# Patient Record
Sex: FEMALE
[De-identification: names, ages, dates, MRNs, and addresses within clinical notes are randomized per-mention and may not be internally consistent; named-entity substitution may affect disease eponyms.]

---

## 2024-09-05 PROBLEM — Z00.00 ENCOUNTER FOR PREVENTIVE HEALTH EXAMINATION: Status: ACTIVE | Noted: 2024-09-05

## 2024-09-10 ENCOUNTER — APPOINTMENT (OUTPATIENT)
Dept: PEDIATRIC CARDIOLOGY | Facility: CLINIC | Age: 34
End: 2024-09-10
Payer: COMMERCIAL

## 2024-09-10 VITALS
BODY MASS INDEX: 41.1 KG/M2 | DIASTOLIC BLOOD PRESSURE: 84 MMHG | HEART RATE: 67 BPM | WEIGHT: 271.17 LBS | HEIGHT: 68.11 IN | SYSTOLIC BLOOD PRESSURE: 145 MMHG | OXYGEN SATURATION: 97 %

## 2024-09-10 VITALS — SYSTOLIC BLOOD PRESSURE: 123 MMHG | DIASTOLIC BLOOD PRESSURE: 76 MMHG

## 2024-09-10 DIAGNOSIS — Q21.0 VENTRICULAR SEPTAL DEFECT: ICD-10-CM

## 2024-09-10 DIAGNOSIS — I47.29 OTHER VENTRICULAR TACHYCARDIA: ICD-10-CM

## 2024-09-10 DIAGNOSIS — R00.2 PALPITATIONS: ICD-10-CM

## 2024-09-10 DIAGNOSIS — Z78.9 OTHER SPECIFIED HEALTH STATUS: ICD-10-CM

## 2024-09-10 DIAGNOSIS — Z95.810 PRESENCE OF AUTOMATIC (IMPLANTABLE) CARDIAC DEFIBRILLATOR: ICD-10-CM

## 2024-09-10 DIAGNOSIS — Q24.9 CONGENITAL MALFORMATION OF HEART, UNSPECIFIED: ICD-10-CM

## 2024-09-10 DIAGNOSIS — Z82.49 FAMILY HISTORY OF ISCHEMIC HEART DISEASE AND OTHER DISEASES OF THE CIRCULATORY SYSTEM: ICD-10-CM

## 2024-09-10 DIAGNOSIS — Q22.0 PULMONARY VALVE ATRESIA: ICD-10-CM

## 2024-09-10 PROCEDURE — 99204 OFFICE O/P NEW MOD 45 MIN: CPT

## 2024-09-10 PROCEDURE — G2211 COMPLEX E/M VISIT ADD ON: CPT

## 2024-09-10 PROCEDURE — 93000 ELECTROCARDIOGRAM COMPLETE: CPT

## 2024-09-10 RX ORDER — KRILL/OM-3/DHA/EPA/PHOSPHO/AST 1000-230MG
81 CAPSULE ORAL
Refills: 0 | Status: ACTIVE | COMMUNITY

## 2024-09-10 RX ORDER — METOPROLOL SUCCINATE 100 MG/1
100 TABLET, EXTENDED RELEASE ORAL DAILY
Refills: 0 | Status: ACTIVE | COMMUNITY
Start: 2024-09-10

## 2024-09-10 RX ORDER — SERTRALINE 25 MG/1
25 TABLET, FILM COATED ORAL DAILY
Refills: 0 | Status: ACTIVE | COMMUNITY
Start: 2024-09-10

## 2024-09-10 RX ORDER — TOPIRAMATE 50 MG/1
50 TABLET, COATED ORAL DAILY
Refills: 0 | Status: ACTIVE | COMMUNITY
Start: 2024-09-10

## 2024-09-10 RX ORDER — SERTRALINE HYDROCHLORIDE 50 MG/1
50 TABLET, FILM COATED ORAL DAILY
Refills: 0 | Status: ACTIVE | COMMUNITY
Start: 2024-09-10

## 2024-09-11 ENCOUNTER — OUTPATIENT (OUTPATIENT)
Dept: OUTPATIENT SERVICES | Facility: HOSPITAL | Age: 34
LOS: 1 days | End: 2024-09-11
Payer: COMMERCIAL

## 2024-09-11 ENCOUNTER — APPOINTMENT (OUTPATIENT)
Dept: RADIOLOGY | Facility: CLINIC | Age: 34
End: 2024-09-11
Payer: COMMERCIAL

## 2024-09-11 ENCOUNTER — RESULT REVIEW (OUTPATIENT)
Age: 34
End: 2024-09-11

## 2024-09-11 DIAGNOSIS — Q21.0 VENTRICULAR SEPTAL DEFECT: ICD-10-CM

## 2024-09-11 PROBLEM — Z78.9 CURRENT NON-SMOKER: Status: ACTIVE | Noted: 2024-09-10

## 2024-09-11 PROBLEM — Q24.9 CONGENITAL HEART DISEASE: Status: ACTIVE | Noted: 2024-09-11

## 2024-09-11 PROBLEM — R00.2 PALPITATION: Status: ACTIVE | Noted: 2024-09-11

## 2024-09-11 PROCEDURE — 71046 X-RAY EXAM CHEST 2 VIEWS: CPT | Mod: 26

## 2024-09-11 PROCEDURE — 71046 X-RAY EXAM CHEST 2 VIEWS: CPT

## 2024-09-11 NOTE — HISTORY OF PRESENT ILLNESS
[FreeTextEntry1] : Taylor is a 34 y/o with PMHx of pulmonary atresia and VSD s/p classic right BT shunt, Rastelli with 16 mm pulmonary homograft (1 years of age), and most recently a RV-to-PA conduit replacement with a 24 mm valved conduit (6/2002). She underwent a dual-chamber AICD on 5/13/22 for syncope/NSVT that required explantation and replacement due to malfunction/fracture of leads (9/7/22, [facility]). Her current system is a Biotronik and programmed VDD. She was previously following with Dr. Robbins at the [facility], and was most recently seen on 7/8/24.   Taylor presents to clinic today, 9/10/24, with her partner to establish care with the [facility] at [facility]. She moved from [place] about a month ago to be with her partner. She denies any chest pain, does report some dyspnea on exertion and fatigue since moving to [place]; she attributes this to walking more here vs in [place]. Denies light-headedness, dizziness, syncope. Does report palpitations, which have always been present, although are less frequent. Does not measure her BP regularly at home but states she has never been told that it was elevated. Her main complaint today is that the site of her ICD has been tender to the touch, and "bulging" out more than normal. Started experiencing this after moving to [place]. States she has been cautious and denies any heavy lifting or chest trauma. She states there was no acute change/episode, it just started hurting to the touch/bulging. No better or worse over the last 1-2 weeks. No erythema or warmth. No fever.  Currently works from home doing as a . Does not regularly exercise.   Current Medications include: Metoprolol 100mg daily, Sertraline 75mg, Topirimate 50mg, Aspirin 81mg,   Social History: Does not smoke/vape, drinks Alcohol occasionally, denies Marijuana or other illegal drug use  OB/GYN: Not on any birth control  Family History: Mother- Afib, PPM Father- CABG, PPM  Brother- ? AF

## 2024-09-11 NOTE — REVIEW OF SYSTEMS
[Dyspnea on exertion] : dyspnea during exertion [Palpitations] : palpitations [Fever] : no fever [Headache] : no headache [Chills] : no chills [Feeling Fatigued] : not feeling fatigued [Blurry Vision] : no blurred vision [Seeing Double (Diplopia)] : no diplopia [Earache] : no earache [Discharge From Ears] : no discharge from the ears [Sore Throat] : no sore throat [Chest Discomfort] : no chest discomfort [Syncope] : no syncope [Cough] : no cough [Wheezing] : no wheezing [Abdominal Pain] : no abdominal pain [Nausea] : no nausea [Vomiting] : no vomiting [Constipation] : no constipation [Dysuria] : no dysuria [Joint Pain] : no joint pain [Myalgia] : no myalgia [Rash] : no rash [Skin Lesions] : no skin lesions [Dizziness] : no dizziness [Convulsions] : no convulsions [Confusion] : no confusion was observed [Easy Bleeding] : no tendency for easy bleeding [Easy Bruising] : no tendency for easy bruising

## 2024-09-11 NOTE — CARDIOLOGY SUMMARY
[de-identified] : 9/10/24: sinus bradycardia with PACs, rightward axis, nonspecific intraventricular block [de-identified] : 10/17/23-11/3/23: no episodes of tachyarrhythmia, bradyarrhythmia, or any significant pauses recorded. Occasional PVCs including ventricular couplets.  [de-identified] : 2/5/18: she exercised for 7 minutes. Her baseline HR was 80 bbp and BP was 118/80. Peak HR was 164 bpm, 85% predicted. Her peak BP was not obtainable, but 102/71 at 1st minute into recovery (hypotensive response to exercise). Her saturations remained normal throughout the testing. She had symptoms of CP and SOB starting at 5 min. Resolved a few minutes into recovery. Sinus rhythm with BBB throughout. One single PAC with exercise and one single PVC with recovery. PFT showed moderate restrictive pattern at rest. Peak VO2 was 21 mL/kg/min which was 109% predicted.  [de-identified] : 7/8/24: doppler interrogation of the prosthetic pulmonary valve is suboptimal, however, there is probably severe insufficiency based on spectral doppler. There is mild AI. The BiV fxn appears to be normal. The estimated RVSP is unchanged at 50 mmHg + RA pressure.  [de-identified] : 4/26/16: MRI showed RV EDV of 82 mL/m2, RV EF of 38%, LV EF of 57%, PI < 5%, ascending aorta 3.7 cm, patent conduit and PAs  [de-identified] : 7/17/23: VDD, base rate 50, upper track rate 130, mode switching 160/VDIR, AV Delay 250 ms, AF rate > 200, VT monitor interval 150 bpm, 0% a pacing, 0% v pacing, 1 episode of slightly wobbling monomorphic asymptomatic NSVT (11 beats), A sensing 1.7 mV, V sensing 10.8 mV, pacing impedance 443 ohms, shock impedance 61 ohms, threshold 0.9 V @ 0.4 ms, output 1.9 V at 0.4 ms. Normal device function, no new changes made. [de-identified] : 5/13/22: Positive EP study with inducible sustained pulseless fast monomorphic VT s/p successful DC external cardioversion to sinus rhythm, s/p dual chamber transvenous AICD with placement with left pectoral generator. [de-identified] : 5/13/22: mildly elevated RVSP (2/3 systemic), gradient of 12-15 mmHg across the pulmonary homograft, RV EDP of 19 mmHg, no branch PA gradients, mean PA pressure of 28 mmHg, LV EDP of 15 mmHg, no left-sided obstruction, underappreciated PI based on low PA diastolic pressure and RV diastolic dysfunction

## 2024-09-11 NOTE — PHYSICAL EXAM
[Well Developed] : well developed [Well Nourished] : well nourished [Obese] : obese [Normal Conjunctiva] : normal conjunctiva [Normal S1, S2] : normal S1, S2 [No Rub] : no rub [No Gallop] : no gallop [Murmur] : murmur [Clear Lung Fields] : clear lung fields [Good Air Entry] : good air entry [No Respiratory Distress] : no respiratory distress  [Soft] : abdomen soft [Non Tender] : non-tender [Normal Gait] : normal gait [No Edema] : no edema [No Cyanosis] : no cyanosis [No Clubbing] : no clubbing [No Rash] : no rash [Moves all extremities] : moves all extremities [Normal Speech] : normal speech [Alert and Oriented] : alert and oriented [de-identified] : II/VI MIHAELA heard best in the LUSB, I/IV diastolic murmur [de-identified] : pacemaker pocket in left chest with no erythema or warmth, mild tenderness to palpation [de-identified] : 2+ in LUE and pedal pulses bilaterally, RUE with little to no pulse palpable

## 2024-09-11 NOTE — PHYSICAL EXAM
[Well Developed] : well developed [Well Nourished] : well nourished [Obese] : obese [Normal Conjunctiva] : normal conjunctiva [Normal S1, S2] : normal S1, S2 [No Rub] : no rub [No Gallop] : no gallop [Murmur] : murmur [Clear Lung Fields] : clear lung fields [Good Air Entry] : good air entry [No Respiratory Distress] : no respiratory distress  [Soft] : abdomen soft [Non Tender] : non-tender [Normal Gait] : normal gait [No Edema] : no edema [No Cyanosis] : no cyanosis [No Clubbing] : no clubbing [No Rash] : no rash [Moves all extremities] : moves all extremities [Normal Speech] : normal speech [Alert and Oriented] : alert and oriented [de-identified] : II/VI MIHAELA heard best in the LUSB, I/IV diastolic murmur [de-identified] : pacemaker pocket in left chest with no erythema or warmth, mild tenderness to palpation [de-identified] : 2+ in LUE and pedal pulses bilaterally, RUE with little to no pulse palpable

## 2024-09-11 NOTE — HISTORY OF PRESENT ILLNESS
[FreeTextEntry1] : Taylor is a 34 y/o with PMHx of pulmonary atresia and VSD s/p classic right BT shunt, Rastelli with 16 mm pulmonary homograft (1 years of age), and most recently a RV-to-PA conduit replacement with a 24 mm valved conduit (6/2002). She underwent a dual-chamber AICD on 5/13/22 for syncope/NSVT that required explantation and replacement due to malfunction/fracture of leads (9/7/22, [facility]). Her current system is a Biotronik and programmed VDD. She was previously following with Dr. Robbins at the [place] [facility], and was most recently seen on 7/8/24.   Taylor presents to clinic today, 9/10/24, with her partner to establish care with the [facility] at [facility]. She moved from [place] about a month ago to be with her partner. She denies any chest pain, does report some dyspnea on exertion and fatigue since moving to [place]; she attributes this to walking more here vs in [place]. Denies light-headedness, dizziness, syncope. Does report palpitations, which have always been present, although are less frequent. Does not measure her BP regularly at home but states she has never been told that it was elevated. Her main complaint today is that the site of her ICD has been tender to the touch, and "bulging" out more than normal. Started experiencing this after moving to [place]. States she has been cautious and denies any heavy lifting or chest trauma. She states there was no acute change/episode, it just started hurting to the touch/bulging. No better or worse over the last 1-2 weeks. No erythema or warmth. No fever.  Currently works from home doing as a . Does not regularly exercise.   Current Medications include: Metoprolol 100mg daily, Sertraline 75mg, Topirimate 50mg, Aspirin 81mg,   Social History: Does not smoke/vape, drinks Alcohol occasionally, denies Marijuana or other illegal drug use  OB/GYN: Not on any birth control  Family History: Mother- Afib, PPM Father- CABG, PPM  Brother- ? AF

## 2024-09-11 NOTE — DISCUSSION/SUMMARY
[FreeTextEntry1] : Taylor is a 34 y/o with PMHx of pulmonary atresia and VSD s/p classic right BT shunt, Rastelli with 16 mm pulmonary homograft (1 years of age), and most recently a RV-to-PA conduit replacement with a 24 mm valved conduit (6/2002). She underwent a dual-chamber AICD on 5/13/22 for syncope/NSVT that required explantation and replacement due to malfunction/fracture of leads (9/7/22, [facility]).   Pulmonary atresia, VSD s/p classic right BT shunt, Rastelli, and most recently a RV-to-PA conduit replacement (6/2002) -likely has free PI based on diastolic murmur on physical exam, as well as her most recently reported echo and cath from [place] -seems symptomatic with exertion, likely NYHA FC 2 -RV reportedly with normal function on most recent echo, but EF was 38% on MRI in 2016 PLAN ----congenital cardiac CT in 6 months to evaluate conduit, biventricular size and function ----will likely need a CPET in the future ----may need a TPVI in the near future, discussed with patient that we will get more information with the CT first ----stressed SBE ppx 30-60 prior to any dental work  ----red flag signs and sx were discussed with the patient, if she were to develop any significant dyspnea, limitations in activity, she should seek immediate care ----continue current cardiac medications: metoprolol 100 mg daily, aspirin 81 mg daily  NSVT s/p dual chamber AICD Generator pocket bulging/pain without warm, erythema, or fever -leads extracted and Biotronik device implanted in 2022 due to lead fracture/malfunction -reportedly last interrogation was in July of 2024 in [place] with no issues identified, last report we have is from 2023 with 1 episode of asymptomatic NSVT PLAN ----CXR to evaluate device generator and lead placement  ----Device clinic referral for repeat interrogation in October of 2024 ----Continue cardiac medications as above  Healthcare maintenance  PLAN ----stressed the importance of establishing care with a PCP ----stressed the importance of dental hygiene emphasized along with regular dental visits  ----stressed the importance of continued CPAP use throughout the night for VAZQUEZ  F/U PLAN ----CXR ordered today for lead/device eval ----Device clinic on 10/7/24 for interrogation ----follow up in 6 months with a clinic visit, EKG, and cardiac CT

## 2024-09-11 NOTE — REASON FOR VISIT
[Congenital Heart Disease] : congenital heart disease [Other: _____] : [unfilled] [FreeTextEntry1] : New Patient Appointment/Establish Care

## 2024-09-11 NOTE — CARDIOLOGY SUMMARY
[de-identified] : 9/10/24: sinus bradycardia with PACs, rightward axis, nonspecific intraventricular block [de-identified] : 10/17/23-11/3/23: no episodes of tachyarrhythmia, bradyarrhythmia, or any significant pauses recorded. Occasional PVCs including ventricular couplets.  [de-identified] : 2/5/18: she exercised for 7 minutes. Her baseline HR was 80 bbp and BP was 118/80. Peak HR was 164 bpm, 85% predicted. Her peak BP was not obtainable, but 102/71 at 1st minute into recovery (hypotensive response to exercise). Her saturations remained normal throughout the testing. She had symptoms of CP and SOB starting at 5 min. Resolved a few minutes into recovery. Sinus rhythm with BBB throughout. One single PAC with exercise and one single PVC with recovery. PFT showed moderate restrictive pattern at rest. Peak VO2 was 21 mL/kg/min which was 109% predicted.  [de-identified] : 7/8/24: doppler interrogation of the prosthetic pulmonary valve is suboptimal, however, there is probably severe insufficiency based on spectral doppler. There is mild AI. The BiV fxn appears to be normal. The estimated RVSP is unchanged at 50 mmHg + RA pressure.  [de-identified] : 4/26/16: MRI showed RV EDV of 82 mL/m2, RV EF of 38%, LV EF of 57%, PI < 5%, ascending aorta 3.7 cm, patent conduit and PAs  [de-identified] : 7/17/23: VDD, base rate 50, upper track rate 130, mode switching 160/VDIR, AV Delay 250 ms, AF rate > 200, VT monitor interval 150 bpm, 0% a pacing, 0% v pacing, 1 episode of slightly wobbling monomorphic asymptomatic NSVT (11 beats), A sensing 1.7 mV, V sensing 10.8 mV, pacing impedance 443 ohms, shock impedance 61 ohms, threshold 0.9 V @ 0.4 ms, output 1.9 V at 0.4 ms. Normal device function, no new changes made. [de-identified] : 5/13/22: Positive EP study with inducible sustained pulseless fast monomorphic VT s/p successful DC external cardioversion to sinus rhythm, s/p dual chamber transvenous AICD with placement with left pectoral generator. [de-identified] : 5/13/22: mildly elevated RVSP (2/3 systemic), gradient of 12-15 mmHg across the pulmonary homograft, RV EDP of 19 mmHg, no branch PA gradients, mean PA pressure of 28 mmHg, LV EDP of 15 mmHg, no left-sided obstruction, underappreciated PI based on low PA diastolic pressure and RV diastolic dysfunction

## 2024-10-07 ENCOUNTER — APPOINTMENT (OUTPATIENT)
Dept: PEDIATRIC CARDIOLOGY | Facility: CLINIC | Age: 34
End: 2024-10-07
Payer: COMMERCIAL

## 2024-10-07 VITALS
WEIGHT: 274.03 LBS | BODY MASS INDEX: 41.53 KG/M2 | OXYGEN SATURATION: 96 % | SYSTOLIC BLOOD PRESSURE: 124 MMHG | HEART RATE: 62 BPM | HEIGHT: 67.95 IN | DIASTOLIC BLOOD PRESSURE: 83 MMHG

## 2024-10-07 DIAGNOSIS — Q21.0 VENTRICULAR SEPTAL DEFECT: ICD-10-CM

## 2024-10-07 DIAGNOSIS — I47.29 OTHER VENTRICULAR TACHYCARDIA: ICD-10-CM

## 2024-10-07 DIAGNOSIS — Z95.810 PRESENCE OF AUTOMATIC (IMPLANTABLE) CARDIAC DEFIBRILLATOR: ICD-10-CM

## 2024-10-07 PROCEDURE — 93282 PRGRMG EVAL IMPLANTABLE DFB: CPT

## 2024-10-07 PROCEDURE — 99212 OFFICE O/P EST SF 10 MIN: CPT

## 2024-10-10 ENCOUNTER — NON-APPOINTMENT (OUTPATIENT)
Age: 34
End: 2024-10-10

## 2024-10-15 ENCOUNTER — NON-APPOINTMENT (OUTPATIENT)
Age: 34
End: 2024-10-15

## 2024-11-15 ENCOUNTER — NON-APPOINTMENT (OUTPATIENT)
Age: 34
End: 2024-11-15

## 2024-11-15 ENCOUNTER — APPOINTMENT (OUTPATIENT)
Dept: OBGYN | Facility: CLINIC | Age: 34
End: 2024-11-15
Payer: COMMERCIAL

## 2024-11-15 VITALS
SYSTOLIC BLOOD PRESSURE: 164 MMHG | BODY MASS INDEX: 40.81 KG/M2 | WEIGHT: 260 LBS | DIASTOLIC BLOOD PRESSURE: 84 MMHG | HEIGHT: 67 IN

## 2024-11-15 DIAGNOSIS — N92.0 EXCESSIVE AND FREQUENT MENSTRUATION WITH REGULAR CYCLE: ICD-10-CM

## 2024-11-15 DIAGNOSIS — N94.6 DYSMENORRHEA, UNSPECIFIED: ICD-10-CM

## 2024-11-15 DIAGNOSIS — Z01.419 ENCOUNTER FOR GYNECOLOGICAL EXAMINATION (GENERAL) (ROUTINE) W/OUT ABNORMAL FINDINGS: ICD-10-CM

## 2024-11-15 PROCEDURE — 99459 PELVIC EXAMINATION: CPT

## 2024-11-15 PROCEDURE — 99213 OFFICE O/P EST LOW 20 MIN: CPT | Mod: 25

## 2024-11-15 PROCEDURE — 99385 PREV VISIT NEW AGE 18-39: CPT

## 2024-11-21 LAB
CYTOLOGY CVX/VAG DOC THIN PREP: ABNORMAL
HPV HIGH+LOW RISK DNA PNL CVX: NOT DETECTED

## 2025-01-09 ENCOUNTER — APPOINTMENT (OUTPATIENT)
Dept: INTERNAL MEDICINE | Facility: CLINIC | Age: 35
End: 2025-01-09

## 2025-01-23 ENCOUNTER — APPOINTMENT (OUTPATIENT)
Dept: OBGYN | Facility: CLINIC | Age: 35
End: 2025-01-23

## 2025-02-07 ENCOUNTER — NON-APPOINTMENT (OUTPATIENT)
Age: 35
End: 2025-02-07

## 2025-02-11 ENCOUNTER — ASOB RESULT (OUTPATIENT)
Age: 35
End: 2025-02-11

## 2025-02-11 ENCOUNTER — APPOINTMENT (OUTPATIENT)
Dept: OBGYN | Facility: CLINIC | Age: 35
End: 2025-02-11
Payer: COMMERCIAL

## 2025-02-11 ENCOUNTER — TRANSCRIPTION ENCOUNTER (OUTPATIENT)
Age: 35
End: 2025-02-11

## 2025-02-11 PROCEDURE — 76830 TRANSVAGINAL US NON-OB: CPT

## 2025-02-13 ENCOUNTER — APPOINTMENT (OUTPATIENT)
Dept: INTERNAL MEDICINE | Facility: CLINIC | Age: 35
End: 2025-02-13
Payer: COMMERCIAL

## 2025-02-13 ENCOUNTER — NON-APPOINTMENT (OUTPATIENT)
Age: 35
End: 2025-02-13

## 2025-02-13 ENCOUNTER — APPOINTMENT (OUTPATIENT)
Dept: INTERNAL MEDICINE | Facility: CLINIC | Age: 35
End: 2025-02-13

## 2025-02-13 VITALS
HEART RATE: 65 BPM | BODY MASS INDEX: 42.06 KG/M2 | HEIGHT: 67 IN | DIASTOLIC BLOOD PRESSURE: 78 MMHG | OXYGEN SATURATION: 99 % | SYSTOLIC BLOOD PRESSURE: 140 MMHG | WEIGHT: 268 LBS

## 2025-02-13 DIAGNOSIS — R00.2 PALPITATIONS: ICD-10-CM

## 2025-02-13 DIAGNOSIS — E66.01 MORBID (SEVERE) OBESITY DUE TO EXCESS CALORIES: ICD-10-CM

## 2025-02-13 DIAGNOSIS — Q21.0 VENTRICULAR SEPTAL DEFECT: ICD-10-CM

## 2025-02-13 DIAGNOSIS — Z95.810 PRESENCE OF AUTOMATIC (IMPLANTABLE) CARDIAC DEFIBRILLATOR: ICD-10-CM

## 2025-02-13 DIAGNOSIS — F32.A ANXIETY DISORDER, UNSPECIFIED: ICD-10-CM

## 2025-02-13 DIAGNOSIS — G43.909 MIGRAINE, UNSPECIFIED, NOT INTRACTABLE, W/OUT STATUS MIGRAINOSUS: ICD-10-CM

## 2025-02-13 DIAGNOSIS — Q22.0 PULMONARY VALVE ATRESIA: ICD-10-CM

## 2025-02-13 DIAGNOSIS — I50.9 HEART FAILURE, UNSPECIFIED: ICD-10-CM

## 2025-02-13 DIAGNOSIS — F41.9 ANXIETY DISORDER, UNSPECIFIED: ICD-10-CM

## 2025-02-13 DIAGNOSIS — Q24.9 CONGENITAL MALFORMATION OF HEART, UNSPECIFIED: ICD-10-CM

## 2025-02-13 DIAGNOSIS — N92.0 EXCESSIVE AND FREQUENT MENSTRUATION WITH REGULAR CYCLE: ICD-10-CM

## 2025-02-13 DIAGNOSIS — N94.6 DYSMENORRHEA, UNSPECIFIED: ICD-10-CM

## 2025-02-13 DIAGNOSIS — Z00.00 ENCOUNTER FOR GENERAL ADULT MEDICAL EXAMINATION W/OUT ABNORMAL FINDINGS: ICD-10-CM

## 2025-02-13 DIAGNOSIS — I47.29 OTHER VENTRICULAR TACHYCARDIA: ICD-10-CM

## 2025-02-13 PROCEDURE — 36415 COLL VENOUS BLD VENIPUNCTURE: CPT

## 2025-02-13 PROCEDURE — 99385 PREV VISIT NEW AGE 18-39: CPT

## 2025-02-15 DIAGNOSIS — E55.9 VITAMIN D DEFICIENCY, UNSPECIFIED: ICD-10-CM

## 2025-02-15 LAB
25(OH)D3 SERPL-MCNC: 20.3 NG/ML
ALBUMIN SERPL ELPH-MCNC: 4.6 G/DL
ALP BLD-CCNC: 96 U/L
ALT SERPL-CCNC: 46 U/L
ANION GAP SERPL CALC-SCNC: 11 MMOL/L
AST SERPL-CCNC: 31 U/L
BILIRUB SERPL-MCNC: 0.9 MG/DL
BUN SERPL-MCNC: 15 MG/DL
CALCIUM SERPL-MCNC: 9.9 MG/DL
CHLORIDE SERPL-SCNC: 102 MMOL/L
CHOLEST SERPL-MCNC: 196 MG/DL
CO2 SERPL-SCNC: 29 MMOL/L
CREAT SERPL-MCNC: 0.9 MG/DL
EGFR: 85 ML/MIN/1.73M2
FOLATE SERPL-MCNC: >20 NG/ML
GLUCOSE SERPL-MCNC: 94 MG/DL
HBV SURFACE AB SER QL: REACTIVE
HBV SURFACE AG SER QL: NONREACTIVE
HCT VFR BLD CALC: 48.6 %
HCV AB SER QL: NONREACTIVE
HCV S/CO RATIO: 0.11 S/CO
HDLC SERPL-MCNC: 43 MG/DL
HGB BLD-MCNC: 14.7 G/DL
LDLC SERPL CALC-MCNC: 125 MG/DL
MCHC RBC-ENTMCNC: 26.3 PG
MCHC RBC-ENTMCNC: 30.2 G/DL
MCV RBC AUTO: 86.8 FL
NONHDLC SERPL-MCNC: 153 MG/DL
PLATELET # BLD AUTO: 218 K/UL
POTASSIUM SERPL-SCNC: 5.1 MMOL/L
PROT SERPL-MCNC: 7.8 G/DL
RBC # BLD: 5.6 M/UL
RBC # FLD: 13.5 %
SODIUM SERPL-SCNC: 142 MMOL/L
TRIGL SERPL-MCNC: 159 MG/DL
TSH SERPL-ACNC: 2.69 UIU/ML
VIT B12 SERPL-MCNC: 719 PG/ML
WBC # FLD AUTO: 8.34 K/UL

## 2025-02-15 RX ORDER — ERGOCALCIFEROL 1.25 MG/1
1.25 MG CAPSULE ORAL
Qty: 16 | Refills: 0 | Status: ACTIVE | COMMUNITY
Start: 2025-02-15 | End: 1900-01-01

## 2025-02-20 ENCOUNTER — NON-APPOINTMENT (OUTPATIENT)
Age: 35
End: 2025-02-20

## 2025-02-25 ENCOUNTER — APPOINTMENT (OUTPATIENT)
Dept: OBGYN | Facility: CLINIC | Age: 35
End: 2025-02-25
Payer: COMMERCIAL

## 2025-02-25 ENCOUNTER — OUTPATIENT (OUTPATIENT)
Dept: OUTPATIENT SERVICES | Facility: HOSPITAL | Age: 35
LOS: 1 days | End: 2025-02-25
Payer: COMMERCIAL

## 2025-02-25 ENCOUNTER — APPOINTMENT (OUTPATIENT)
Dept: CARDIOLOGY | Facility: CLINIC | Age: 35
End: 2025-02-25
Payer: COMMERCIAL

## 2025-02-25 VITALS
BODY MASS INDEX: 42.06 KG/M2 | SYSTOLIC BLOOD PRESSURE: 127 MMHG | WEIGHT: 268 LBS | DIASTOLIC BLOOD PRESSURE: 82 MMHG | HEIGHT: 67 IN

## 2025-02-25 DIAGNOSIS — Q22.0 PULMONARY VALVE ATRESIA: ICD-10-CM

## 2025-02-25 DIAGNOSIS — I47.29 OTHER VENTRICULAR TACHYCARDIA: ICD-10-CM

## 2025-02-25 DIAGNOSIS — Q21.0 VENTRICULAR SEPTAL DEFECT: ICD-10-CM

## 2025-02-25 DIAGNOSIS — Z95.810 PRESENCE OF AUTOMATIC (IMPLANTABLE) CARDIAC DEFIBRILLATOR: ICD-10-CM

## 2025-02-25 PROCEDURE — 75573 CT HRT C+ STRUX CGEN HRT DS: CPT | Mod: 26

## 2025-02-25 PROCEDURE — 99215 OFFICE O/P EST HI 40 MIN: CPT

## 2025-02-25 PROCEDURE — 75573 CT HRT C+ STRUX CGEN HRT DS: CPT

## 2025-03-03 DIAGNOSIS — Q21.0 VENTRICULAR SEPTAL DEFECT: ICD-10-CM

## 2025-03-04 ENCOUNTER — APPOINTMENT (OUTPATIENT)
Dept: PEDIATRIC CARDIOLOGY | Facility: CLINIC | Age: 35
End: 2025-03-04
Payer: COMMERCIAL

## 2025-03-04 VITALS
DIASTOLIC BLOOD PRESSURE: 75 MMHG | HEART RATE: 61 BPM | BODY MASS INDEX: 40.86 KG/M2 | WEIGHT: 272.71 LBS | HEIGHT: 68.31 IN | OXYGEN SATURATION: 97 % | SYSTOLIC BLOOD PRESSURE: 126 MMHG

## 2025-03-04 DIAGNOSIS — Q22.0 PULMONARY VALVE ATRESIA: ICD-10-CM

## 2025-03-04 DIAGNOSIS — Q21.0 VENTRICULAR SEPTAL DEFECT: ICD-10-CM

## 2025-03-04 DIAGNOSIS — I47.29 OTHER VENTRICULAR TACHYCARDIA: ICD-10-CM

## 2025-03-04 DIAGNOSIS — I47.20 VENTRICULAR TACHYCARDIA, UNSPECIFIED: ICD-10-CM

## 2025-03-04 DIAGNOSIS — Q25.5 ATRESIA OF PULMONARY ARTERY: ICD-10-CM

## 2025-03-04 DIAGNOSIS — Q24.9 CONGENITAL MALFORMATION OF HEART, UNSPECIFIED: ICD-10-CM

## 2025-03-04 PROCEDURE — G0404: CPT

## 2025-03-04 PROCEDURE — 99214 OFFICE O/P EST MOD 30 MIN: CPT

## 2025-05-05 ENCOUNTER — APPOINTMENT (OUTPATIENT)
Dept: PEDIATRIC CARDIOLOGY | Facility: CLINIC | Age: 35
End: 2025-05-05
Payer: COMMERCIAL

## 2025-05-05 DIAGNOSIS — Q25.5 ATRESIA OF PULMONARY ARTERY: ICD-10-CM

## 2025-05-05 DIAGNOSIS — I47.29 OTHER VENTRICULAR TACHYCARDIA: ICD-10-CM

## 2025-05-05 PROCEDURE — 99213 OFFICE O/P EST LOW 20 MIN: CPT

## 2025-05-05 PROCEDURE — 94681 O2 UPTK CO2 OUTP % O2 XTRC: CPT

## 2025-05-05 PROCEDURE — 93015 CV STRESS TEST SUPVJ I&R: CPT

## 2025-05-05 PROCEDURE — 94010 BREATHING CAPACITY TEST: CPT

## 2025-05-05 PROCEDURE — 93283 PRGRMG EVAL IMPLANTABLE DFB: CPT

## 2025-06-20 RX ORDER — AMOXICILLIN 500 MG/1
500 TABLET, FILM COATED ORAL
Qty: 4 | Refills: 1 | Status: ACTIVE | COMMUNITY
Start: 2025-06-20 | End: 1900-01-01

## 2025-07-07 ENCOUNTER — APPOINTMENT (OUTPATIENT)
Dept: PEDIATRIC CARDIOLOGY | Facility: CLINIC | Age: 35
End: 2025-07-07
Payer: COMMERCIAL

## 2025-07-07 ENCOUNTER — APPOINTMENT (OUTPATIENT)
Dept: PEDIATRIC CARDIOLOGY | Facility: CLINIC | Age: 35
End: 2025-07-07

## 2025-07-07 VITALS
WEIGHT: 273.37 LBS | HEART RATE: 84 BPM | HEIGHT: 67.91 IN | SYSTOLIC BLOOD PRESSURE: 93 MMHG | BODY MASS INDEX: 41.91 KG/M2 | DIASTOLIC BLOOD PRESSURE: 62 MMHG | OXYGEN SATURATION: 97 %

## 2025-07-07 VITALS — DIASTOLIC BLOOD PRESSURE: 72 MMHG | SYSTOLIC BLOOD PRESSURE: 107 MMHG

## 2025-07-07 PROCEDURE — 99214 OFFICE O/P EST MOD 30 MIN: CPT

## 2025-07-07 PROCEDURE — G0404: CPT

## 2025-07-07 PROCEDURE — 93303 ECHO TRANSTHORACIC: CPT

## 2025-07-07 PROCEDURE — 93320 DOPPLER ECHO COMPLETE: CPT

## 2025-07-07 PROCEDURE — 93325 DOPPLER ECHO COLOR FLOW MAPG: CPT

## 2025-07-08 VITALS
OXYGEN SATURATION: 97 % | WEIGHT: 273.37 LBS | HEART RATE: 84 BPM | SYSTOLIC BLOOD PRESSURE: 107 MMHG | BODY MASS INDEX: 41.91 KG/M2 | DIASTOLIC BLOOD PRESSURE: 72 MMHG | HEIGHT: 67.91 IN

## 2025-07-08 PROBLEM — Q25.5: Status: ACTIVE | Noted: 2025-07-08

## 2025-07-08 LAB
ALBUMIN SERPL ELPH-MCNC: 4.7 G/DL
ALP BLD-CCNC: 104 U/L
ALT SERPL-CCNC: 33 U/L
ANION GAP SERPL CALC-SCNC: 14 MMOL/L
AST SERPL-CCNC: 30 U/L
BASOPHILS # BLD AUTO: 0.06 K/UL
BASOPHILS NFR BLD AUTO: 0.7 %
BILIRUB SERPL-MCNC: 0.5 MG/DL
BUN SERPL-MCNC: 15 MG/DL
CALCIUM SERPL-MCNC: 10.1 MG/DL
CHLORIDE SERPL-SCNC: 101 MMOL/L
CO2 SERPL-SCNC: 22 MMOL/L
CREAT SERPL-MCNC: 0.91 MG/DL
CRP SERPL-MCNC: 4 MG/L
EGFRCR SERPLBLD CKD-EPI 2021: 84 ML/MIN/1.73M2
EOSINOPHIL # BLD AUTO: 0.1 K/UL
EOSINOPHIL NFR BLD AUTO: 1.2 %
ERYTHROCYTE [SEDIMENTATION RATE] IN BLOOD BY WESTERGREN METHOD: 16 MM/HR
GLUCOSE SERPL-MCNC: 93 MG/DL
HCT VFR BLD CALC: 47.4 %
HGB BLD-MCNC: 14.5 G/DL
IMM GRANULOCYTES NFR BLD AUTO: 0.4 %
LYMPHOCYTES # BLD AUTO: 2.89 K/UL
LYMPHOCYTES NFR BLD AUTO: 34.2 %
MAN DIFF?: NORMAL
MCHC RBC-ENTMCNC: 27 PG
MCHC RBC-ENTMCNC: 30.6 G/DL
MCV RBC AUTO: 88.1 FL
MONOCYTES # BLD AUTO: 0.59 K/UL
MONOCYTES NFR BLD AUTO: 7 %
NEUTROPHILS # BLD AUTO: 4.79 K/UL
NEUTROPHILS NFR BLD AUTO: 56.5 %
NT-PROBNP SERPL-MCNC: 620 PG/ML
PLATELET # BLD AUTO: 206 K/UL
POTASSIUM SERPL-SCNC: 3.9 MMOL/L
PROCALCITONIN SERPL-MCNC: 0.09 NG/ML
PROT SERPL-MCNC: 7.9 G/DL
RBC # BLD: 5.38 M/UL
RBC # FLD: 14.2 %
SODIUM SERPL-SCNC: 137 MMOL/L
WBC # FLD AUTO: 8.46 K/UL

## 2025-07-09 ENCOUNTER — RX RENEWAL (OUTPATIENT)
Age: 35
End: 2025-07-09

## 2025-07-14 LAB
BACTERIA BLD CULT: NORMAL
BACTERIA BLD CULT: NORMAL

## 2025-07-16 ENCOUNTER — NON-APPOINTMENT (OUTPATIENT)
Age: 35
End: 2025-07-16

## 2025-07-16 ENCOUNTER — APPOINTMENT (OUTPATIENT)
Dept: PEDIATRIC CARDIOLOGY | Facility: CLINIC | Age: 35
End: 2025-07-16

## 2025-07-23 ENCOUNTER — APPOINTMENT (OUTPATIENT)
Dept: PEDIATRIC CARDIOLOGY | Facility: CLINIC | Age: 35
End: 2025-07-23
Payer: COMMERCIAL

## 2025-07-23 VITALS
SYSTOLIC BLOOD PRESSURE: 126 MMHG | DIASTOLIC BLOOD PRESSURE: 89 MMHG | HEIGHT: 67.72 IN | HEART RATE: 60 BPM | WEIGHT: 274.7 LBS | BODY MASS INDEX: 42.12 KG/M2 | OXYGEN SATURATION: 98 %

## 2025-07-23 DIAGNOSIS — Q21.0 VENTRICULAR SEPTAL DEFECT: ICD-10-CM

## 2025-07-23 DIAGNOSIS — Q24.9 CONGENITAL MALFORMATION OF HEART, UNSPECIFIED: ICD-10-CM

## 2025-07-23 DIAGNOSIS — I11.9 HYPERTENSIVE HEART DISEASE W/OUT HEART FAILURE: ICD-10-CM

## 2025-07-23 DIAGNOSIS — I47.29 OTHER VENTRICULAR TACHYCARDIA: ICD-10-CM

## 2025-07-23 DIAGNOSIS — Q22.0 PULMONARY VALVE ATRESIA: ICD-10-CM

## 2025-07-23 DIAGNOSIS — I37.1 NONRHEUMATIC PULMONARY VALVE INSUFFICIENCY: ICD-10-CM

## 2025-07-23 DIAGNOSIS — R00.2 PALPITATIONS: ICD-10-CM

## 2025-07-23 DIAGNOSIS — I51.89 OTHER ILL-DEFINED HEART DISEASES: ICD-10-CM

## 2025-07-23 PROCEDURE — 93000 ELECTROCARDIOGRAM COMPLETE: CPT

## 2025-07-23 PROCEDURE — G2211 COMPLEX E/M VISIT ADD ON: CPT | Mod: NC

## 2025-07-23 PROCEDURE — 99215 OFFICE O/P EST HI 40 MIN: CPT

## 2025-08-18 ENCOUNTER — NON-APPOINTMENT (OUTPATIENT)
Age: 35
End: 2025-08-18

## 2025-08-21 ENCOUNTER — NON-APPOINTMENT (OUTPATIENT)
Age: 35
End: 2025-08-21

## 2025-08-26 ENCOUNTER — APPOINTMENT (OUTPATIENT)
Dept: OBGYN | Facility: CLINIC | Age: 35
End: 2025-08-26

## 2025-08-26 VITALS
WEIGHT: 270 LBS | SYSTOLIC BLOOD PRESSURE: 147 MMHG | HEIGHT: 67 IN | BODY MASS INDEX: 42.38 KG/M2 | DIASTOLIC BLOOD PRESSURE: 84 MMHG

## 2025-08-26 DIAGNOSIS — N89.8 OTHER SPECIFIED NONINFLAMMATORY DISORDERS OF VAGINA: ICD-10-CM

## 2025-08-26 DIAGNOSIS — B37.31 ACUTE CANDIDIASIS OF VULVA AND VAGINA: ICD-10-CM

## 2025-08-26 PROCEDURE — 99214 OFFICE O/P EST MOD 30 MIN: CPT

## 2025-08-26 RX ORDER — NYSTATIN AND TRIAMCINOLONE ACETONIDE 100000; 1 [USP'U]/G; MG/G
100000-0.1 OINTMENT TOPICAL TWICE DAILY
Qty: 1 | Refills: 1 | Status: ACTIVE | COMMUNITY
Start: 2025-08-26 | End: 1900-01-01

## 2025-08-26 RX ORDER — FLUCONAZOLE 150 MG/1
150 TABLET ORAL
Qty: 2 | Refills: 0 | Status: ACTIVE | COMMUNITY
Start: 2025-08-26 | End: 1900-01-01

## 2025-08-29 LAB
C TRACH RRNA SPEC QL NAA+PROBE: NOT DETECTED
CANDIDA VAG CYTO: NOT DETECTED
G VAGINALIS+PREV SP MTYP VAG QL MICRO: DETECTED
HSV+VZV DNA SPEC QL NAA+PROBE: NOT DETECTED
N GONORRHOEA RRNA SPEC QL NAA+PROBE: NOT DETECTED
SOURCE AMPLIFICATION: NORMAL
SPECIMEN SOURCE: NORMAL
T VAGINALIS VAG QL WET PREP: NOT DETECTED

## 2025-08-29 RX ORDER — METRONIDAZOLE 7.5 MG/G
0.75 GEL VAGINAL
Qty: 1 | Refills: 0 | Status: ACTIVE | COMMUNITY
Start: 2025-08-29 | End: 1900-01-01